# Patient Record
Sex: MALE | Race: ASIAN
[De-identification: names, ages, dates, MRNs, and addresses within clinical notes are randomized per-mention and may not be internally consistent; named-entity substitution may affect disease eponyms.]

---

## 2020-02-06 ENCOUNTER — TELEPHONE (OUTPATIENT)
Dept: SCHEDULING | Age: 40
End: 2020-02-06

## 2020-02-12 ENCOUNTER — TELEPHONE (OUTPATIENT)
Dept: SCHEDULING | Age: 40
End: 2020-02-12

## 2022-03-29 ENCOUNTER — OFFICE VISIT (OUTPATIENT)
Dept: AUDIOLOGY | Facility: CLINIC | Age: 42
End: 2022-03-29
Payer: COMMERCIAL

## 2022-03-29 DIAGNOSIS — H93.19 TINNITUS, UNSPECIFIED LATERALITY: Primary | ICD-10-CM

## 2022-03-29 PROCEDURE — 92557 COMPREHENSIVE HEARING TEST: CPT | Performed by: AUDIOLOGIST

## 2022-03-29 PROCEDURE — 92567 TYMPANOMETRY: CPT | Performed by: AUDIOLOGIST

## 2022-03-29 NOTE — PROGRESS NOTES
AUDIOGRAM     Amada Hong was referred for testing by Dr Meghna Rodriguez with [facility]  8/21/1980  [id number]      History    Mr. Jason Bangura is here today for audiolgical evaluation  His primary complaint is tinnitus in both ears. This seems to come and go, but over the last two years has become increasingly bothersome to the patient    There is no history of ear surgeries, nor family history of hearing loss    He does note listening to loud music for many years    Otoscopic inspection: right ear no cerumen; left ear no cerumen. Tests/Procedures  Patient was tested via  standard insert earphones and a bone oscillator standard audiometry     Audiometric Results (Pure Tone Results)    Audiometric thresholds indicate essentially normal hearing sensitivity in both ears    Speech Testing  Speech Reception Threshold (SRT). The lowest level at which speech is first recognized  Speech reception threshold of 10dB was recorded in the right ear  Speech reception threshold of 10dB was recorded in the left ear   These results are in good agreement with the aforementioned pure tone findings    Word Recognition Score  A word recognition score of 100% was recorded in the right ear  A word recognition score of 100% was recorded in the left ear   These results suggest excellent understanding ability in both ears at a normal loudness level. Immitance Test Results    A tympanogram was performed  Tympanometry suggests normal findings bilaterally. Summary  Essentially normal hearing sensitivity in both ears    Recommendations: Follow up with Dr. Meghna Rodriguez. Audiological monitoring as needed during the course of medical management. Patient was given handout on tinnitus.     3/29/2022  Carolina Arguello

## 2023-12-23 ENCOUNTER — OFFICE VISIT (OUTPATIENT)
Dept: FAMILY MEDICINE CLINIC | Facility: CLINIC | Age: 43
End: 2023-12-23
Payer: COMMERCIAL

## 2023-12-23 VITALS
BODY MASS INDEX: 24.75 KG/M2 | WEIGHT: 154 LBS | OXYGEN SATURATION: 99 % | DIASTOLIC BLOOD PRESSURE: 74 MMHG | HEIGHT: 66 IN | SYSTOLIC BLOOD PRESSURE: 110 MMHG | HEART RATE: 58 BPM | RESPIRATION RATE: 18 BRPM | TEMPERATURE: 97 F

## 2023-12-23 DIAGNOSIS — B02.9 HERPES ZOSTER WITHOUT COMPLICATION: Primary | ICD-10-CM

## 2023-12-23 RX ORDER — VALACYCLOVIR HYDROCHLORIDE 1 G/1
1000 TABLET, FILM COATED ORAL 3 TIMES DAILY
Qty: 21 TABLET | Refills: 0 | Status: SHIPPED | OUTPATIENT
Start: 2023-12-23 | End: 2023-12-30

## 2024-02-20 ENCOUNTER — HOSPITAL ENCOUNTER (OUTPATIENT)
Age: 44
Discharge: HOME | End: 2024-02-20
Payer: COMMERCIAL

## 2024-02-20 VITALS
RESPIRATION RATE: 22 BRPM | OXYGEN SATURATION: 99 % | SYSTOLIC BLOOD PRESSURE: 120 MMHG | DIASTOLIC BLOOD PRESSURE: 86 MMHG | TEMPERATURE: 99 F | HEART RATE: 77 BPM

## 2024-02-20 DIAGNOSIS — U07.1 COVID-19: Primary | ICD-10-CM

## 2024-02-20 LAB — SARS-COV-2 RNA RESP QL NAA+PROBE: DETECTED

## 2024-02-20 PROCEDURE — 99213 OFFICE O/P EST LOW 20 MIN: CPT | Performed by: NURSE PRACTITIONER

## 2024-02-20 PROCEDURE — U0002 COVID-19 LAB TEST NON-CDC: HCPCS | Performed by: NURSE PRACTITIONER

## 2024-02-20 RX ORDER — BENZONATATE 200 MG/1
200 CAPSULE ORAL 3 TIMES DAILY PRN
Qty: 20 CAPSULE | Refills: 0 | Status: SHIPPED | OUTPATIENT
Start: 2024-02-20

## 2024-02-20 NOTE — DISCHARGE INSTRUCTIONS
Social isolation for 5 days 5 days on day 6 you may go out wearing a mask for additional 5 days as you could be still shedding the virus as this is the recommendation of the CDC.  If you develop worsening symptoms such as chest pain shortness of breath nausea vomiting or diarrhea severe headache or high fever go to the emergency department.  For mild symptoms such as body aches chills slight headache low-grade temp take Tylenol/ibuprofen.  Hydrate well but do not over hydrate and eat food as tolerated.  Recommend over-the-counter Flonase and Zyrtec to help with any runny nose or congestion.

## 2024-02-20 NOTE — ED PROVIDER NOTES
Patient Seen in: [facility]      History     Chief Complaint   Patient presents with    Cough/URI     Stated Complaint: coughing exposure to covid    Subjective:   HPI    This is a 43-year-old male presenting for coughing body aches chills fever sore throat and congestion.  Patient states his symptoms started on Friday his daughter recently had COVID so he had COVID exposure.  Patient states his throat is really uncomfortable and like a burning sensation and is concerned that he might have strep throat.  Denies any nausea vomiting or diarrhea chest pain or shortness of breath.    Objective:   History reviewed. No pertinent past medical history.           History reviewed. No pertinent surgical history.             Social History     Socioeconomic History    Marital status: Single   Tobacco Use    Smoking status: Unknown              Review of Systems    Positive for stated complaint: coughing exposure to covid  Other systems are as noted in HPI.  Constitutional and vital signs reviewed.      All other systems reviewed and negative except as noted above.    Physical Exam     ED Triage Vitals [02/20/24 1210]   /86   Pulse 77   Resp 22   Temp 99 °F (37.2 °C)   Temp src Temporal   SpO2 99 %   O2 Device None (Room air)       Current:/86   Pulse 77   Temp 99 °F (37.2 °C) (Temporal)   Resp 22   SpO2 99%         Physical Exam  Vitals and nursing note reviewed.   Constitutional:       Appearance: Normal appearance.   HENT:      Right Ear: Tympanic membrane normal.      Left Ear: Tympanic membrane normal.      Nose: Congestion present. No rhinorrhea.      Mouth/Throat:      Mouth: Mucous membranes are moist.      Pharynx: Oropharynx is clear. No oropharyngeal exudate or posterior oropharyngeal erythema.      Comments: No tonsillar swelling or exudates uvula midline no hot potato voice no trismus  Eyes:      Conjunctiva/sclera: Conjunctivae normal.   Cardiovascular:      Rate and Rhythm: Normal  rate.   Pulmonary:      Effort: Pulmonary effort is normal. No respiratory distress.      Breath sounds: Normal breath sounds. No wheezing.      Comments: + cough  Musculoskeletal:         General: Normal range of motion.      Cervical back: Normal range of motion. No rigidity or tenderness.   Lymphadenopathy:      Cervical: No cervical adenopathy.   Neurological:      Mental Status: He is alert and oriented to person, place, and time.               ED Course     Labs Reviewed   RAPID SARS-COV-2 BY PCR - Abnormal; Notable for the following components:       Result Value    Rapid SARS-CoV-2 by PCR Detected (*)     All other components within normal limits                      MDM                    Medical Decision Making  43-year-old male well-appearing and nontoxic no distress no hypoxia low-grade temp with viral symptoms and exposure to COVID.  Concern for COVID or another viral illness possible influenza or viral URI no clinical indication for labs or imaging but he will be swabbed for COVID.    COVID-positive patient made aware of results discussed supportive therapy will prescribe benzonatate to help with the cough.  Discussed social isolation.  All education instructions including ER precautions placed in discharge paperwork.  Patient acknowledged understanding instructions.    Problems Addressed:  COVID-19: acute illness or injury    Amount and/or Complexity of Data Reviewed  Labs:  Decision-making details documented in ED Course.    Risk  OTC drugs.  Prescription drug management.        Disposition and Plan     Clinical Impression:  1. COVID-19         Disposition:  Discharge  2/20/2024 12:25 pm    Follow-up:  Corinne Yan MD  [facility] [address] [facility] [address]  [facility] [address]  [phone]      As needed          Medications Prescribed:  Discharge Medication List as of 2/20/2024 12:26 PM        START taking these medications    Details   benzonatate 200 MG Oral Cap Take 1 capsule (200 mg total) by  mouth 3 (three) times daily as needed for cough., Normal, Disp-20 capsule, R-0

## 2025-04-24 ENCOUNTER — OFFICE VISIT (OUTPATIENT)
Dept: FAMILY MEDICINE CLINIC | Facility: CLINIC | Age: 45
End: 2025-04-24
Payer: COMMERCIAL

## 2025-04-24 VITALS
HEART RATE: 74 BPM | SYSTOLIC BLOOD PRESSURE: 112 MMHG | WEIGHT: 151 LBS | HEIGHT: 66 IN | RESPIRATION RATE: 16 BRPM | OXYGEN SATURATION: 98 % | BODY MASS INDEX: 24.27 KG/M2 | DIASTOLIC BLOOD PRESSURE: 78 MMHG | TEMPERATURE: 99 F

## 2025-04-24 DIAGNOSIS — Z20.818 EXPOSURE TO STREP THROAT: ICD-10-CM

## 2025-04-24 DIAGNOSIS — J06.9 VIRAL URI WITH COUGH: Primary | ICD-10-CM

## 2025-04-24 LAB
CONTROL LINE PRESENT WITH A CLEAR BACKGROUND (YES/NO): YES YES/NO
KIT LOT #: NORMAL NUMERIC
STREP GRP A CUL-SCR: NEGATIVE

## 2025-04-24 PROCEDURE — 87880 STREP A ASSAY W/OPTIC: CPT | Performed by: NURSE PRACTITIONER

## 2025-04-24 PROCEDURE — 87637 SARSCOV2&INF A&B&RSV AMP PRB: CPT | Performed by: NURSE PRACTITIONER

## 2025-04-24 PROCEDURE — 87081 CULTURE SCREEN ONLY: CPT | Performed by: NURSE PRACTITIONER

## 2025-04-24 PROCEDURE — 99213 OFFICE O/P EST LOW 20 MIN: CPT | Performed by: NURSE PRACTITIONER

## 2025-04-24 NOTE — PROGRESS NOTES
CHIEF COMPLAINT:     Chief Complaint   Patient presents with    Sore Throat     Son + strep yesterday  Sx Tuesday - Dry cough, ST, general headache, nasal congestion, runny nose, PND, loss of appetite  Denies chest congestion, sinus pressure, ear pain/pressure, fever, chills, body aches, n/v/d, SOB, rash  No Covid test was done at home  No OTC       HPI:   Joe Alonso is a 44 year old male who presents for upper respiratory symptoms for  5 days. Patient reports sore throat, congestion, dry cough.  Associated symptoms include chills/sweats, headache, body aches.  Denies fever, sob/wheezing.  Symptoms have been worsening since onset.  Treating symptoms with nothing.  Son dx with strep yesterday.   Requests testing.    + hx of COVID; No COVID exposure  No recent travel.  No large gatherings.      Other conditions:   BMI: Body mass index is 24.37 kg/m².  Heart Disease: NO  DM: NO  Chronic Lung Disease: NO  Chronic Kidney Disease: NO  Liver Disease: NO  Immunocompromised: (Transplant, Heme-Onc, Chronic Steroid Use): NO    Current Medications[1]   Past Medical History[2]   Past Surgical History[3]      Short Social Hx on File[4]      REVIEW OF SYSTEMS:   GENERAL: feels well otherwise, normal appetite  SKIN: no rashes or abnormal skin lesions  HEENT: See HPI  LUNGS: denies shortness of breath or wheezing, See HPI  CARDIOVASCULAR: denies chest pain or palpitations   GI: denies N/V/C or abdominal pain  NEURO: denies dizziness or lightheadedness    EXAM:   /78   Pulse 74   Temp 98.5 °F (36.9 °C) (Tympanic)   Resp 16   Ht 5' 6\" (1.676 m)   Wt 151 lb (68.5 kg)   SpO2 98%   BMI 24.37 kg/m²     Physical Exam  Vitals reviewed.   Constitutional:       General: He is not in acute distress.     Appearance: Normal appearance. He is not ill-appearing.   HENT:      Head: Normocephalic and atraumatic.      Right Ear: Tympanic membrane and ear canal normal.      Left Ear: Tympanic membrane and ear canal normal.       Nose: Congestion and rhinorrhea present. Rhinorrhea is clear.      Right Sinus: No maxillary sinus tenderness or frontal sinus tenderness.      Left Sinus: No maxillary sinus tenderness or frontal sinus tenderness.      Mouth/Throat:      Lips: Pink.      Mouth: Mucous membranes are moist.      Pharynx: Oropharynx is clear. Uvula midline.   Eyes:      Extraocular Movements: Extraocular movements intact.      Conjunctiva/sclera: Conjunctivae normal.   Cardiovascular:      Rate and Rhythm: Normal rate and regular rhythm.      Heart sounds: Normal heart sounds. No murmur heard.  Pulmonary:      Effort: Pulmonary effort is normal.      Breath sounds: Normal breath sounds and air entry.   Abdominal:      General: Bowel sounds are normal.      Palpations: Abdomen is soft.      Tenderness: There is no abdominal tenderness.   Musculoskeletal:      Cervical back: Normal range of motion and neck supple.   Lymphadenopathy:      Cervical: No cervical adenopathy.   Skin:     General: Skin is warm and dry.      Findings: No rash.   Neurological:      General: No focal deficit present.      Mental Status: He is alert.   Psychiatric:         Speech: Speech normal.         Behavior: Behavior normal. Behavior is cooperative.          Recent Results (from the past 24 hours)   Strep A Assay W/Optic    Collection Time: 04/24/25 11:23 AM   Result Value Ref Range    Strep Grp A Screen Negative Negative    Control Line Present with a clear background (yes/no) Yes Yes/No    Kit Lot # [id number] Numeric    Kit Expiration Date 6/3/26 Date       ASSESSMENT AND PLAN:   Joe Alonso is a 44 year old male who presents with     ASSESSMENT:   Encounter Diagnoses   Name Primary?    Viral URI with cough Yes    Exposure to strep throat        PLAN:   Discussed likely viral etiology. Will send a quad test   Will send follow up strep culture.  Reviewed symptom relief measures with patient.   Monitor for worsening symptoms.  Comfort care as described in  Patient Instructions  Medications as below.      Meds & Refills for this Visit:  Requested Prescriptions      No prescriptions requested or ordered in this encounter       Risks, benefits, and side effects of medication explained and discussed.  To f/u with PCP if sx do not resolve as anticipated.  The patient indicates understanding of these issues and agrees to the plan.        Patient Instructions     Instruction for viral upper respiratory infections:  You have a viral upper respiratory illness (URI), which is another term for the common cold. The virus is contagious during the first 4-5 days. It is spread through the air by coughing, sneezing, or by direct contact (touching your sick child then touching your own eyes, nose, or mouth). Sore throat is a common accompanying symptom. Frequent handwashing will decrease risk of spread. Most viral illnesses resolve within 7 to 14 days with rest and simple home remedies. However, they may sometimes last up to 4 weeks. Expect the cough to gradually worsen the first 4-5 days, then peak and slowly go away. The nasal mucous can become thick, yellow or yellow/green during the last half of the cold (but should not last past day 14 of the cold). Antibiotics will not kill a virus and are not prescribed for this condition.    General comfort measures:  Get rest!  Hydrate! (cold or hot based on comfort). Drink lots of water or other non dehydrating liquids to help with illness. Salty foods, soups and tea can help with throat pain.   Hand washing-use hand  or wash hands frequently, cover your cough or sneeze, do not share towels or drinks with others.  Salt water gargles (1 tsp. Salt in 6 oz lukewarm water): gargle for 2 minutes, repeat every 15 minutes as needed to help decrease swelling and relieve pain.  Use humidified air, steamy showers/baths and use vaporizer in sleeping quarters to keep secretions thin.  Avoid smoking.    Symptom management:    Nasal  congestion/Post-nasal-drip: Saline nasal spray to nostrils to help remove drainage or an antihistamine to help dry up drainage.    Sinus congestion/Post-nasal-drip: OTC Nasacort or Flonase (steroid nasal spray) nightly for 2 weeks. May take Sudafed (D) or Sudafed-PE, if not contraindicated (do not take if you have HTN).   Pain/discomfort:  May use Tylenol or Ibuprofen, if not contraindicated.  Cough:  May take DM-dextromethorophan over the counter (long lasting). Ex: Delsym  Chest congestion:  May take guaifenesin with a lot of water.  Ex:  Plain Mucinex.  Sore throat:  Cepacol lozenges or Chloroseptic throat spray (active ingredient Benzocaine).      Follow up with your PCP in 1-2 weeks if not better.  Follow up in a few days if worsening symptoms. Seek immediate care if inability to swallow or breathe.                            [1]   No current outpatient medications on file.   [2] No past medical history on file.  [3] No past surgical history on file.  [4]   Social History  Socioeconomic History    Marital status: Single   Tobacco Use    Smoking status: Unknown

## 2025-04-24 NOTE — PATIENT INSTRUCTIONS
Instruction for viral upper respiratory infections:  You have a viral upper respiratory illness (URI), which is another term for the common cold. The virus is contagious during the first 4-5 days. It is spread through the air by coughing, sneezing, or by direct contact (touching your sick child then touching your own eyes, nose, or mouth). Sore throat is a common accompanying symptom. Frequent handwashing will decrease risk of spread. Most viral illnesses resolve within 7 to 14 days with rest and simple home remedies. However, they may sometimes last up to 4 weeks. Expect the cough to gradually worsen the first 4-5 days, then peak and slowly go away. The nasal mucous can become thick, yellow or yellow/green during the last half of the cold (but should not last past day 14 of the cold). Antibiotics will not kill a virus and are not prescribed for this condition.    General comfort measures:  Get rest!  Hydrate! (cold or hot based on comfort). Drink lots of water or other non dehydrating liquids to help with illness. Salty foods, soups and tea can help with throat pain.   Hand washing-use hand  or wash hands frequently, cover your cough or sneeze, do not share towels or drinks with others.  Salt water gargles (1 tsp. Salt in 6 oz lukewarm water): gargle for 2 minutes, repeat every 15 minutes as needed to help decrease swelling and relieve pain.  Use humidified air, steamy showers/baths and use vaporizer in sleeping quarters to keep secretions thin.  Avoid smoking.    Symptom management:    Nasal congestion/Post-nasal-drip: Saline nasal spray to nostrils to help remove drainage or an antihistamine to help dry up drainage.    Sinus congestion/Post-nasal-drip: OTC Nasacort or Flonase (steroid nasal spray) nightly for 2 weeks. May take Sudafed (D) or Sudafed-PE, if not contraindicated (do not take if you have HTN).   Pain/discomfort:  May use Tylenol or Ibuprofen, if not contraindicated.  Cough:  May take  DM-dextromethorophan over the counter (long lasting). Ex: Delsym  Chest congestion:  May take guaifenesin with a lot of water.  Ex:  Plain Mucinex.  Sore throat:  Cepacol lozenges or Chloroseptic throat spray (active ingredient Benzocaine).      Follow up with your PCP in 1-2 weeks if not better.  Follow up in a few days if worsening symptoms. Seek immediate care if inability to swallow or breathe.

## 2025-04-25 LAB
FLUAV + FLUBV RNA SPEC NAA+PROBE: DETECTED
FLUAV + FLUBV RNA SPEC NAA+PROBE: NOT DETECTED
RSV RNA SPEC NAA+PROBE: NOT DETECTED
SARS-COV-2 RNA RESP QL NAA+PROBE: NOT DETECTED

## (undated) NOTE — LETTER
[facility] [address] [address]  [phone]     Patient: Joe Alonso   YOB: 1980   Date of Visit: 2/20/2024     Dear Employer,        February 20, 2024    At [facility], we are taking special precautions and doing everything we can to prevent the spread of COVID-19. During this time, we ask for your assistance regarding physician documentation for employees to return to work following a respiratory illness.     The Centers for Disease Control (CDC) recommends the following:    Ensure that sick leave policies are flexible and consistent with public health guidance, and employees are aware of and understand these policies.   Maintain flexible policies that permit employees to stay home to care for a sick family member or to take care of children due to school and  closures.   Employers should not require a COVID-19 test result or a healthcare provider’s note for sick employees to validate their illness, qualify for sick leave or return to work.   Be aware that healthcare provider offices and medical facilities may be extremely busy and not able to provide documentation in a timely manner. Most people with COVID-19 have mild illness, can recover at home without medical care and can follow CDC recommendations to determine when to discontinue home isolation and return to work.      Individuals with COVID-19 symptoms directed to care for themselves at home should:  Isolate for five days. If individuals are asymptomatic or symptoms are resolving (without fever for 24 hours), isolation may be discontinued on day six. Onset of symptoms is considered day zero.   Follow isolation by five days of mask wearing when around others to minimize the risk of infection.     Individuals infected with SARS-CoV-2 who never develop COVID-19 symptoms:    Day of positive test is considered day zero.   Isolate for five days.  Can discontinue isolation on day six.   Follow  isolation by five days of wearing a mask when around others to minimize the risk of infection.    Individuals who are asymptomatic but have been exposed:  For people who are unvaccinated or are more than six months out from their second mRNA dose (or more than two months after the J&J vaccine) and not yet boosted, CDC now recommends quarantine for five days followed by strict, mask use for an additional five days. Alternatively, if a five-day quarantine is not feasible, it is imperative that an exposed person wear a well-fitting mask at all times when around others for 10 days after exposure.   Individuals who have received their booster shot do not need to quarantine following an exposure but should wear a mask for 10 days after the exposure.    Best practice would also include a test on day five after exposure.   If symptoms occur, individuals should immediately quarantine until a negative test confirms symptoms are not attributable to COVID-19.     Please visit the CDC website for further information and details to assist you during this challenging time.     Sincerely,     TRISTAN Gonzales